# Patient Record
Sex: FEMALE | Race: WHITE | Employment: UNEMPLOYED | URBAN - NONMETROPOLITAN AREA
[De-identification: names, ages, dates, MRNs, and addresses within clinical notes are randomized per-mention and may not be internally consistent; named-entity substitution may affect disease eponyms.]

---

## 2021-10-27 ENCOUNTER — OFFICE VISIT (OUTPATIENT)
Dept: FAMILY MEDICINE CLINIC | Facility: CLINIC | Age: 36
End: 2021-10-27
Payer: MEDICAID

## 2021-10-27 ENCOUNTER — LAB ENCOUNTER (OUTPATIENT)
Dept: LAB | Age: 36
End: 2021-10-27
Attending: FAMILY MEDICINE
Payer: MEDICAID

## 2021-10-27 VITALS
RESPIRATION RATE: 16 BRPM | SYSTOLIC BLOOD PRESSURE: 130 MMHG | OXYGEN SATURATION: 98 % | BODY MASS INDEX: 32.64 KG/M2 | HEART RATE: 118 BPM | WEIGHT: 175.13 LBS | DIASTOLIC BLOOD PRESSURE: 70 MMHG | HEIGHT: 61.5 IN | TEMPERATURE: 98 F

## 2021-10-27 DIAGNOSIS — M25.50 ARTHRALGIA OF MULTIPLE SITES: ICD-10-CM

## 2021-10-27 DIAGNOSIS — L95.9 VASCULITIS OF SKIN: Primary | ICD-10-CM

## 2021-10-27 DIAGNOSIS — Z23 NEED FOR VACCINATION: ICD-10-CM

## 2021-10-27 DIAGNOSIS — L95.9 VASCULITIS OF SKIN: ICD-10-CM

## 2021-10-27 PROCEDURE — 3075F SYST BP GE 130 - 139MM HG: CPT | Performed by: FAMILY MEDICINE

## 2021-10-27 PROCEDURE — 85652 RBC SED RATE AUTOMATED: CPT

## 2021-10-27 PROCEDURE — 86812 HLA TYPING A B OR C: CPT

## 2021-10-27 PROCEDURE — 3008F BODY MASS INDEX DOCD: CPT | Performed by: FAMILY MEDICINE

## 2021-10-27 PROCEDURE — 86200 CCP ANTIBODY: CPT

## 2021-10-27 PROCEDURE — 3078F DIAST BP <80 MM HG: CPT | Performed by: FAMILY MEDICINE

## 2021-10-27 PROCEDURE — 90471 IMMUNIZATION ADMIN: CPT | Performed by: FAMILY MEDICINE

## 2021-10-27 PROCEDURE — 36415 COLL VENOUS BLD VENIPUNCTURE: CPT

## 2021-10-27 PROCEDURE — 99204 OFFICE O/P NEW MOD 45 MIN: CPT | Performed by: FAMILY MEDICINE

## 2021-10-27 PROCEDURE — 86431 RHEUMATOID FACTOR QUANT: CPT

## 2021-10-27 PROCEDURE — 86038 ANTINUCLEAR ANTIBODIES: CPT

## 2021-10-27 PROCEDURE — 90686 IIV4 VACC NO PRSV 0.5 ML IM: CPT | Performed by: FAMILY MEDICINE

## 2021-10-27 PROCEDURE — 86140 C-REACTIVE PROTEIN: CPT

## 2021-10-27 PROCEDURE — 84550 ASSAY OF BLOOD/URIC ACID: CPT

## 2021-10-27 RX ORDER — FLUVOXAMINE MALEATE 100 MG
1 TABLET ORAL NIGHTLY
COMMUNITY
Start: 2021-09-30

## 2021-10-27 RX ORDER — DOXYCYCLINE 100 MG/1
100 CAPSULE ORAL 2 TIMES DAILY
Qty: 28 CAPSULE | Refills: 0 | Status: SHIPPED | OUTPATIENT
Start: 2021-10-27 | End: 2021-11-10

## 2021-10-27 RX ORDER — OLANZAPINE 5 MG/1
1 TABLET ORAL NIGHTLY
COMMUNITY
Start: 2021-09-28

## 2021-10-27 RX ORDER — ERGOCALCIFEROL (VITAMIN D2) 1250 MCG
1.25 CAPSULE ORAL WEEKLY
COMMUNITY
Start: 2021-08-30

## 2021-10-27 RX ORDER — CLONAZEPAM 1 MG/1
1 TABLET ORAL 2 TIMES DAILY
COMMUNITY
Start: 2021-10-01

## 2021-10-27 RX ORDER — MELOXICAM 15 MG/1
15 TABLET ORAL DAILY
Qty: 30 TABLET | Refills: 0 | Status: SHIPPED | OUTPATIENT
Start: 2021-10-27 | End: 2021-11-26

## 2021-10-28 ENCOUNTER — OFFICE VISIT (OUTPATIENT)
Dept: FAMILY MEDICINE CLINIC | Facility: CLINIC | Age: 36
End: 2021-10-28
Payer: MEDICAID

## 2021-10-28 VITALS
HEIGHT: 61.5 IN | BODY MASS INDEX: 32.62 KG/M2 | RESPIRATION RATE: 12 BRPM | HEART RATE: 88 BPM | OXYGEN SATURATION: 98 % | DIASTOLIC BLOOD PRESSURE: 80 MMHG | WEIGHT: 175 LBS | TEMPERATURE: 97 F | SYSTOLIC BLOOD PRESSURE: 130 MMHG

## 2021-10-28 DIAGNOSIS — F25.0 SCHIZOAFFECTIVE DISORDER, BIPOLAR TYPE (HCC): ICD-10-CM

## 2021-10-28 DIAGNOSIS — F41.9 ANXIETY: ICD-10-CM

## 2021-10-28 DIAGNOSIS — Z00.00 WELL ADULT EXAM: Primary | ICD-10-CM

## 2021-10-28 DIAGNOSIS — M25.50 ARTHRALGIA OF MULTIPLE SITES: ICD-10-CM

## 2021-10-28 DIAGNOSIS — L95.9 VASCULITIS OF SKIN: ICD-10-CM

## 2021-10-28 DIAGNOSIS — F43.10 PTSD (POST-TRAUMATIC STRESS DISORDER): ICD-10-CM

## 2021-10-28 PROBLEM — F25.9 SCHIZOAFFECTIVE DISORDER (HCC): Status: ACTIVE | Noted: 2020-05-06

## 2021-10-28 PROCEDURE — 3079F DIAST BP 80-89 MM HG: CPT | Performed by: FAMILY MEDICINE

## 2021-10-28 PROCEDURE — 99395 PREV VISIT EST AGE 18-39: CPT | Performed by: FAMILY MEDICINE

## 2021-10-28 PROCEDURE — 3008F BODY MASS INDEX DOCD: CPT | Performed by: FAMILY MEDICINE

## 2021-10-28 PROCEDURE — 3075F SYST BP GE 130 - 139MM HG: CPT | Performed by: FAMILY MEDICINE

## 2021-10-31 NOTE — PROGRESS NOTES
Sheree Zamarripa is a 43year old female. CC:  Patient presents with:  CPX: no pap pt willl reschedule is on her period . ...... inrm 5      Subjective:     Chief Complaint Reviewed and Verified  Nursing Notes Reviewed and   Verified  Tobacco Reviewed Last 3 Encounters:  10/28/21 : 130/80  10/27/21 : 130/70    REVIEW OF SYSTEMS:   GENERAL: feels well otherwise  SKIN: denies any unusual skin lesions  EYES:denies blurred vision or double vision  HEENT: denies nasal congestion, sinus pain or ST  LUNGS: den Prescriptions      No prescriptions requested or ordered in this encounter       Imaging & Consults:  None    No follow-ups on file.     Young Randhawa M.D., FAAFP

## 2021-10-31 NOTE — PROGRESS NOTES
Subjective:   Rafa Tong is a 43year old female who presents for Derm Problem (hands,legs,neck .  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 Dispense: 28 capsule; Refill: 0  -     Rheumatoid Arthritis Factor; Future; Expected date: 10/27/2021  -     Uric Acid;  Future; Expected date

## 2021-11-04 DIAGNOSIS — M25.50 ARTHRALGIA OF MULTIPLE SITES: ICD-10-CM

## 2021-11-04 DIAGNOSIS — Z82.61 FAMILY HISTORY OF RHEUMATOID ARTHRITIS: Primary | ICD-10-CM

## 2021-12-09 ENCOUNTER — TELEPHONE (OUTPATIENT)
Dept: FAMILY MEDICINE CLINIC | Facility: CLINIC | Age: 36
End: 2021-12-09

## 2021-12-09 NOTE — TELEPHONE ENCOUNTER
Patient needs to schedule for pap. Was on period when here for physical. Please give her a call. Thanks!

## 2021-12-10 NOTE — TELEPHONE ENCOUNTER
Future Appointments   Date Time Provider Gricel Pablo   12/21/2021  8:30 AM Janel Sales MD [facility] [place]   1/14/2022  8:30 AM Martha Hayes MD [facility] [place]

## 2021-12-21 ENCOUNTER — TELEPHONE (OUTPATIENT)
Dept: FAMILY MEDICINE CLINIC | Facility: CLINIC | Age: 36
End: 2021-12-21

## 2021-12-23 ENCOUNTER — TELEPHONE (OUTPATIENT)
Dept: FAMILY MEDICINE CLINIC | Facility: CLINIC | Age: 36
End: 2021-12-23

## 2022-01-14 ENCOUNTER — TELEPHONE (OUTPATIENT)
Dept: RHEUMATOLOGY | Facility: CLINIC | Age: 37
End: 2022-01-14

## 2022-01-14 NOTE — TELEPHONE ENCOUNTER
Called pt and LVM to give our office a call back. Pt had an appointment with Dr. Ela Stuart today (01/14) as a new pt and she no showed. Tried calling the mobile as well, however  was not set up for pt's mobile.

## 2024-12-10 ENCOUNTER — PATIENT OUTREACH (OUTPATIENT)
Dept: CASE MANAGEMENT | Age: 39
End: 2024-12-10

## 2024-12-10 NOTE — PROCEDURES
The office order for PCP removal request is Approved and finalized on December 10, 2024.    Removed Velasquez Small MD as the patient's Primary Care Physician

## (undated) NOTE — LETTER
[facility] [id number]  [phone]          Date: 10/28/2021      Patient Name: Claudia Frannie            To Whom it may concern:     The above patient was evaluated at